# Patient Record
Sex: FEMALE | Race: OTHER | URBAN - METROPOLITAN AREA
[De-identification: names, ages, dates, MRNs, and addresses within clinical notes are randomized per-mention and may not be internally consistent; named-entity substitution may affect disease eponyms.]

---

## 2023-08-07 ENCOUNTER — OFFICE VISIT (OUTPATIENT)
Dept: OBGYN CLINIC | Facility: CLINIC | Age: 18
End: 2023-08-07
Payer: COMMERCIAL

## 2023-08-07 VITALS
DIASTOLIC BLOOD PRESSURE: 72 MMHG | WEIGHT: 187.81 LBS | SYSTOLIC BLOOD PRESSURE: 120 MMHG | HEIGHT: 62 IN | BODY MASS INDEX: 34.56 KG/M2

## 2023-08-07 DIAGNOSIS — Z11.3 SCREEN FOR STD (SEXUALLY TRANSMITTED DISEASE): ICD-10-CM

## 2023-08-07 DIAGNOSIS — Z30.09 FAMILY PLANNING COUNSELING: Primary | ICD-10-CM

## 2023-08-07 PROCEDURE — 3078F DIAST BP <80 MM HG: CPT | Performed by: OBSTETRICS & GYNECOLOGY

## 2023-08-07 PROCEDURE — 87491 CHLMYD TRACH DNA AMP PROBE: CPT | Performed by: OBSTETRICS & GYNECOLOGY

## 2023-08-07 PROCEDURE — 99203 OFFICE O/P NEW LOW 30 MIN: CPT | Performed by: OBSTETRICS & GYNECOLOGY

## 2023-08-07 PROCEDURE — 3074F SYST BP LT 130 MM HG: CPT | Performed by: OBSTETRICS & GYNECOLOGY

## 2023-08-07 PROCEDURE — 87591 N.GONORRHOEAE DNA AMP PROB: CPT | Performed by: OBSTETRICS & GYNECOLOGY

## 2023-08-07 PROCEDURE — 3008F BODY MASS INDEX DOCD: CPT | Performed by: OBSTETRICS & GYNECOLOGY

## 2023-08-07 NOTE — PROGRESS NOTES
New Patient GYN History and Physical  [facility]    CHIEF COMPLAINT:  Patient presents with:  Consult: contraception   HISTORY OF PRESENT ILLNESS:   Benita Le is a 21year old female [facility]  who presents for family planning. Has been sexually active with current partner x 3 months. Using condoms. Feels safe, denies abuse. PAST GYNECOLOGICAL HISTORY & OTHER PREVENTIVE MEDICINE  LMP: Patient's last menstrual period was 2023. Menarche: 11years old (2023 11:26 AM)  Period Cycle (Days): monthly (2023 11:26 AM)  Period Duration (Days): 4-5 days (2023 11:26 AM)  Period Flow: moderate (2023 11:26 AM)  Use of Birth Control (if yes, specify type): None (2023 11:26 AM)  Pap Result Notes: no pap hx under 21 (2023 48:99 AM)    Complications: monthly, no c/o  Gravita/Parity:   Contraception: current -condom; Previous -   Sexually transmitted disease history:None  Number of sexual partners: current sexual partners: 1, 3 months yrs, Lifetime partners: 1  Pap history: NALast pap/result: ; history abnormals:   Abuse history: denies  Vaginal discharge: denies  Bladder symptoms: denies    PAST MEDICAL HISTORY:   Past Medical History:   Diagnosis Date    Anxiety     Depression         PAST SURGICAL HISTORY:   Past Surgical History:   Procedure Laterality Date    Patient denies any surgical history          PAST OB HISTORY:  OB History    Para Term  AB Living   0 0 0 0 0 0   SAB IAB Ectopic Multiple Live Births   0 0 0 0 0       CURRENT MEDICATIONS:    No current outpatient medications on file.     ALLERGIES:  No Known Allergies    SOCIAL HISTORY:  Social History    Socioeconomic History      Marital status: Single    Tobacco Use      Smoking status: Never      Smokeless tobacco: Never    Substance and Sexual Activity      Alcohol use: Never      Drug use: Never      Sexual activity: Yes        Partners: Male    Other Topics      Concerns:        Blood Transfusions: No      FAMILY HISTORY:  Family History   Problem Relation Age of Onset    Brain Cancer Father 79    Breast Cancer Neg     Uterine Cancer Neg     Ovarian Cancer Neg      ASSESSMENTS:  PHYSICAL EXAM:   Patient's last menstrual period was 08/02/2023.    08/07/23  1125   BP: 120/72   Weight: 187 lb 12.8 oz (85.2 kg)   Height: 62\"     CONSTITUTIONAL: Awake, alert, cooperative, no apparent distress, and appears stated age   NECK: Supple, symmetrical, trachea midline, no adenopathy, thyroid symmetric, not enlarged and no tenderness  LUNGS: Clear to auscultation bilaterally, no crackles or wheezing  CARDIOVASCULAR: Regular rate and rhythm, normal S1 and S2, no murmur noted  ABDOMEN: Soft, non-distended, non-tender, no masses palpated      GENITAL/URINARY:  deferred    MUSCULOSKELETAL: There is no redness, warmth, or swelling of the joints. Full range of motion noted. Motor strength is 5 out of 5 all extremities bilaterally. Tone is normal.  NEUROLOGIC: Patient is awake, alert and oriented to name, place and time. Casual gait is normal.  SKIN: no bruising or bleeding and no rashes  PSYCHIATRIC: Behavior:  Appropriate  Mood:  appropriate  ASSESSMENT AND PLAN:  1. Family planning counseling  I reviewed options with patient for contraception including all methods (OCPs, DMPA, patch, ring, condoms, IUDs, Implants, and sterilization). Discussed efficacy of each, benefits and risks of methods, typical side effects, mechanism of action, and how to use methods. I reviewed that no method is 100% effective and that failures do occur. Patient is most interested in [place]. Provided patient with information about this method and patient will return for placement. Advised to premedicate with ibuprofen. 2. Screen for STD (sexually transmitted disease)    - CHLAMYDIA/GONOCOCCUS, SUHA;  Future       follow up 1 yr or as needed  Alcon Hernandez MD

## 2023-08-08 LAB
C TRACH DNA SPEC QL NAA+PROBE: NEGATIVE
N GONORRHOEA DNA SPEC QL NAA+PROBE: NEGATIVE

## 2023-08-19 ENCOUNTER — APPOINTMENT (OUTPATIENT)
Dept: ULTRASOUND IMAGING | Facility: HOSPITAL | Age: 18
End: 2023-08-19
Attending: INTERNAL MEDICINE
Payer: COMMERCIAL

## 2023-08-19 ENCOUNTER — HOSPITAL ENCOUNTER (INPATIENT)
Facility: HOSPITAL | Age: 18
LOS: 1 days | Discharge: HOME | End: 2023-08-20
Attending: HOSPITALIST | Admitting: INTERNAL MEDICINE
Payer: COMMERCIAL

## 2023-08-19 ENCOUNTER — TELEPHONE (OUTPATIENT)
Dept: CASE MANAGEMENT | Facility: HOSPITAL | Age: 18
End: 2023-08-19

## 2023-08-19 PROBLEM — N70.92 OVARIAN ABSCESS: Status: ACTIVE | Noted: 2023-08-19

## 2023-08-19 LAB — B-HCG UR QL: NEGATIVE

## 2023-08-19 PROCEDURE — 76856 US EXAM PELVIC COMPLETE: CPT | Performed by: INTERNAL MEDICINE

## 2023-08-19 PROCEDURE — 99222 1ST HOSP IP/OBS MODERATE 55: CPT | Performed by: INTERNAL MEDICINE

## 2023-08-19 PROCEDURE — 76830 TRANSVAGINAL US NON-OB: CPT | Performed by: INTERNAL MEDICINE

## 2023-08-19 RX ORDER — IBUPROFEN 200 MG
200 TABLET ORAL EVERY 4 HOURS PRN
Status: DISCONTINUED | OUTPATIENT
Start: 2023-08-19 | End: 2023-08-20

## 2023-08-19 RX ORDER — METRONIDAZOLE 500 MG/100ML
500 INJECTION, SOLUTION INTRAVENOUS 2 TIMES DAILY
Status: DISCONTINUED | OUTPATIENT
Start: 2023-08-19 | End: 2023-08-20

## 2023-08-19 NOTE — PLAN OF CARE
From home to [facility] Direct admit this morning with abdominal pain. Morphine was given at [facility]. Message sent perfect serve to Dr Amena Doran at 10:10 AM and to Dr Lakesha Elena at 11:24 and called at 12:50 perfect serve page, and again perfect serve 12:56 text since patient now requesting to see JESSICA CLARKE informed. Awaiting response. 1400- orders received. Went down for [place]. ABX ordered. IV site beeping, attempting restart for ABXendorsed to oncoming RN. Problem: Patient Centered Care  Goal: Patient preferences are identified and integrated in the patient's plan of care  Description: Interventions:  - What would you like us to know as we care for you?  My OB GYNE doctor is here  - Provide timely, complete, and accurate information to patient/family  - Incorporate patient and family knowledge, values, beliefs, and cultural backgrounds into the planning and delivery of care  - Encourage patient/family to participate in care and decision-making at the level they choose  - Honor patient and family perspectives and choices  Outcome: Progressing     Problem: Patient/Family Goals  Goal: Patient/Family Long Term Goal  Description: Patient's Long Term Goal: return home    Interventions:  - MD to see and eval  - See additional Care Plan goals for specific interventions  Outcome: Progressing  Goal: Patient/Family Short Term Goal  Description: Patient's Short Term Goal: Pain <5/10    Interventions:   - Awaiting orders for pain management  - See additional Care Plan goals for specific interventions  Outcome: Progressing     Problem: PAIN - ADULT  Goal: Verbalizes/displays adequate comfort level or patient's stated pain goal  Description: INTERVENTIONS:  - Encourage pt to monitor pain and request assistance  - Assess pain using appropriate pain scale  - Administer analgesics based on type and severity of pain and evaluate response  - Implement non-pharmacological measures as appropriate and evaluate response  - Consider cultural and social influences on pain and pain management  - Manage/alleviate anxiety  - Utilize distraction and/or relaxation techniques  - Monitor for opioid side effects  - Notify MD/LIP if interventions unsuccessful or patient reports new pain  - Anticipate increased pain with activity and pre-medicate as appropriate  Outcome: Progressing     Problem: RISK FOR INFECTION - ADULT  Goal: Absence of fever/infection during anticipated neutropenic period  Description: INTERVENTIONS  - Monitor WBC  - Administer growth factors as ordered  - Implement neutropenic guidelines  Outcome: Progressing     Problem: SAFETY ADULT - FALL  Goal: Free from fall injury  Description: INTERVENTIONS:  - Assess pt frequently for physical needs  - Identify cognitive and physical deficits and behaviors that affect risk of falls.   - [place] fall precautions as indicated by assessment.  - Educate pt/family on patient safety including physical limitations  - Instruct pt to call for assistance with activity based on assessment  - Modify environment to reduce risk of injury  - Provide assistive devices as appropriate  - Consider OT/PT consult to assist with strengthening/mobility  - Encourage toileting schedule  Outcome: Progressing     Problem: DISCHARGE PLANNING  Goal: Discharge to home or other facility with appropriate resources  Description: INTERVENTIONS:  - Identify barriers to discharge w/pt and caregiver  - Include patient/family/discharge partner in discharge planning  - Arrange for needed discharge resources and transportation as appropriate  - Identify discharge learning needs (meds, wound care, etc)  - Arrange for interpreters to assist at discharge as needed  - Consider post-discharge preferences of patient/family/discharge partner  - Complete POLST form as appropriate  - Assess patient's ability to be responsible for managing their own health  - Refer to Case Management Department for coordinating discharge planning if the patient needs post-hospital services based on physician/LIP order or complex needs related to functional status, cognitive ability or social support system  Outcome: Progressing

## 2023-08-20 VITALS
DIASTOLIC BLOOD PRESSURE: 57 MMHG | TEMPERATURE: 98 F | RESPIRATION RATE: 18 BRPM | OXYGEN SATURATION: 100 % | BODY MASS INDEX: 33 KG/M2 | WEIGHT: 179 LBS | SYSTOLIC BLOOD PRESSURE: 115 MMHG | HEART RATE: 69 BPM

## 2023-08-20 LAB
ALBUMIN SERPL-MCNC: 3.3 G/DL (ref 3.4–5)
ALBUMIN/GLOB SERPL: 1 {RATIO} (ref 1–2)
ALP LIVER SERPL-CCNC: 59 U/L
ALT SERPL-CCNC: 11 U/L
ANION GAP SERPL CALC-SCNC: 5 MMOL/L (ref 0–18)
AST SERPL-CCNC: 11 U/L (ref 15–37)
BASOPHILS # BLD AUTO: 0.01 X10(3) UL (ref 0–0.2)
BASOPHILS NFR BLD AUTO: 0.1 %
BILIRUB SERPL-MCNC: 0.3 MG/DL (ref 0.1–2)
BUN BLD-MCNC: 17 MG/DL (ref 7–18)
BUN/CREAT SERPL: 24.3 (ref 10–20)
CALCIUM BLD-MCNC: 9 MG/DL (ref 8.5–10.1)
CHLORIDE SERPL-SCNC: 109 MMOL/L (ref 98–112)
CO2 SERPL-SCNC: 28 MMOL/L (ref 21–32)
CREAT BLD-MCNC: 0.7 MG/DL
DEPRECATED RDW RBC AUTO: 42.1 FL (ref 35.1–46.3)
EGFRCR SERPLBLD CKD-EPI 2021: 128 ML/MIN/1.73M2 (ref 60–?)
EOSINOPHIL # BLD AUTO: 0.06 X10(3) UL (ref 0–0.7)
EOSINOPHIL NFR BLD AUTO: 0.9 %
ERYTHROCYTE [DISTWIDTH] IN BLOOD BY AUTOMATED COUNT: 12.8 % (ref 11–15)
GLOBULIN PLAS-MCNC: 3.2 G/DL (ref 2.8–4.4)
GLUCOSE BLD-MCNC: 90 MG/DL (ref 70–99)
HCT VFR BLD AUTO: 34.9 %
HGB BLD-MCNC: 11.6 G/DL
IMM GRANULOCYTES # BLD AUTO: 0.03 X10(3) UL (ref 0–1)
IMM GRANULOCYTES NFR BLD: 0.4 %
LYMPHOCYTES # BLD AUTO: 1.96 X10(3) UL (ref 1.5–5)
LYMPHOCYTES NFR BLD AUTO: 28.7 %
MCH RBC QN AUTO: 29.5 PG (ref 26–34)
MCHC RBC AUTO-ENTMCNC: 33.2 G/DL (ref 31–37)
MCV RBC AUTO: 88.8 FL
MONOCYTES # BLD AUTO: 0.68 X10(3) UL (ref 0.1–1)
MONOCYTES NFR BLD AUTO: 10 %
NEUTROPHILS # BLD AUTO: 4.09 X10 (3) UL (ref 1.5–7.7)
NEUTROPHILS # BLD AUTO: 4.09 X10(3) UL (ref 1.5–7.7)
NEUTROPHILS NFR BLD AUTO: 59.9 %
OSMOLALITY SERPL CALC.SUM OF ELEC: 295 MOSM/KG (ref 275–295)
PLATELET # BLD AUTO: 308 10(3)UL (ref 150–450)
POTASSIUM SERPL-SCNC: 4.5 MMOL/L (ref 3.5–5.1)
PROT SERPL-MCNC: 6.5 G/DL (ref 6.4–8.2)
RBC # BLD AUTO: 3.93 X10(6)UL
SODIUM SERPL-SCNC: 142 MMOL/L (ref 136–145)
WBC # BLD AUTO: 6.8 X10(3) UL (ref 4–11)

## 2023-08-20 PROCEDURE — 99239 HOSP IP/OBS DSCHRG MGMT >30: CPT | Performed by: HOSPITALIST

## 2023-08-20 RX ORDER — DOXYCYCLINE HYCLATE 100 MG/1
100 CAPSULE ORAL 2 TIMES DAILY
Qty: 28 CAPSULE | Refills: 0 | Status: SHIPPED | OUTPATIENT
Start: 2023-08-20 | End: 2023-09-03

## 2023-08-20 RX ORDER — IBUPROFEN 200 MG
200 TABLET ORAL EVERY 4 HOURS PRN
Qty: 20 TABLET | Refills: 0 | Status: SHIPPED | OUTPATIENT
Start: 2023-08-20 | End: 2023-08-25

## 2023-08-20 RX ORDER — METRONIDAZOLE 500 MG/1
500 TABLET ORAL 3 TIMES DAILY
Qty: 42 TABLET | Refills: 0 | Status: SHIPPED | OUTPATIENT
Start: 2023-08-20 | End: 2023-09-03

## 2023-08-20 RX ORDER — ONDANSETRON 4 MG/1
4 TABLET, FILM COATED ORAL EVERY 8 HOURS PRN
Qty: 20 TABLET | Refills: 0 | Status: SHIPPED | OUTPATIENT
Start: 2023-08-20

## 2023-08-20 NOTE — DISCHARGE SUMMARY
[facility]     Discharge Summary    Jc Nick Patient Status:  Inpatient    12/15/2005 MRN [id number]   Location [facility] 4W/SW/SE Attending Kanika Brito MD   Hosp Day # 1 PCP No primary care provider on file. Date of Admission: 2023    Date of Discharge: 2023    Admitting Diagnosis: Ovarian abscess [N70.92]    Discharge Diagnosis: Patient Active Problem List:     Ovarian abscess      Reason for Admission:     Abdominal Pain    Physical Exam:     HEENT: Normocephalic atraumatic. Moist mucous membranes. EOM-I. PERRLA. Anicteric. Neck: No lymphadenopathy. No JVD. No carotid bruits. Respiratory: Clear to auscultation bilaterally. No wheezes. No rhonchi. Cardiovascular: S1, S2. Regular rate and rhythm. No murmurs, rubs or gallops. Equal pulses. Chest and Back: No tenderness or deformity. Abdomen: Soft, nontender, nondistended. Positive bowel sounds. No rebound, guarding or organomegaly. Neurologic: No focal neurological deficits. CNII-XII grossly intact. Musculoskeletal: Moves all extremities. Extremities: No edema or cyanosis. Psychiatric: Appropriate mood and affect. Integument: No rashes or lesions. Hospital Course:     #Concern for possible tubo-ovarian abscess  -1 more dose of Rocephin 1 g today  -Ultrasound  -Ibuprofen as needed  -Consult OB/GYN    PLAN TO DC WITH 14 DAYS OF PO DOXY AND FLAGYL  OUTPATIENT F/U WITH OB/GYNE      History of Present Illness:     PER ADMITTING ATTENDING:   Jc Nick is a 21year old female with no significant past medical history who reports acute onset of periumbilical sharp stabbing pain that started around 9 PM last night. She tried to have a bowel movement thinking this would make it better but was unable to. Went to the emergency room for evaluation where CAT scan was performed and was suspicious for tubo-ovarian abscess. Last menstrual period was approximately 2 weeks ago.   This type of event has never happened to her before. He was given Rocephin and IV morphine as well as had a pelvic exam.  Her white count was mildly elevated at 12,000. GC chlamydia and wet mount are pending. She requested transfer to [place] where her obstetrician resides. She is sitting up in bed without obvious distress. She has had no pain throughout today. We are now at 18 hours past the onset of this episode last night at 9 PM.    Disposition: Final discharge disposition not confirmed    Discharge Condition: Stable    Discharge Medications: Current Discharge Medication List    START taking these medications    ibuprofen 200 MG Oral Tab  Take 1 tablet (200 mg total) by mouth every 4 (four) hours as needed for Fever (equal to or greater than 100.4). Qty: 20 tablet Refills: 0    doxycycline 100 MG Oral Cap  Take 1 capsule (100 mg total) by mouth 2 (two) times daily for 14 days. Qty: 28 capsule Refills: 0    metRONIDAZOLE 500 MG Oral Tab  Take 1 tablet (500 mg total) by mouth 3 (three) times daily for 14 days. Qty: 42 tablet Refills: 0          TOTAL DC TIME > 30 MIN    Rasheeda Meadows MD  8/20/2023  12:15 PM     Hospital Discharge Diagnoses:  toa    Lace+ Score: 16  59-90 High Risk  29-58 Medium Risk  0-28   Low Risk. TCM Follow-Up Recommendation:  LACE 29-58:  Moderate Risk of readmission after discharge from the hospital.

## 2023-08-20 NOTE — PLAN OF CARE
Patient is alert and oriented x4. Up ad christine. Patient voiding and passing flatus. Patient reports bowel movement today. Patient on regular diet, tolerating. Patient denies any complaints at this time. Per Dr. Mel Dewitt patient to receive today's dose of ceftriaxone and then ok to discharge. Discharge home today with family. Reviewed discharge instructions with patient. Patient verbalized understanding. RX sent to pharmacy for doxycycline, zofran, ibuprofen, and metronidazole. Home with family.        Problem: Patient Centered Care  Goal: Patient preferences are identified and integrated in the patient's plan of care  Description: Interventions:  - What would you like us to know as we care for you?   - Provide timely, complete, and accurate information to patient/family  - Incorporate patient and family knowledge, values, beliefs, and cultural backgrounds into the planning and delivery of care  - Encourage patient/family to participate in care and decision-making at the level they choose  - Honor patient and family perspectives and choices  8/20/2023 1217 by Juan F Bucio RN  Outcome: Adequate for Discharge  8/20/2023 1216 by Juan F Bucio RN  Outcome: Adequate for Discharge     Problem: Patient/Family Goals  Goal: Patient/Family Long Term Goal  Description: Patient's Long Term Goal:discharge     Interventions:  - abx   -pain management   -ambulate   - See additional Care Plan goals for specific interventions  8/20/2023 1217 by Juan F Bucio RN  Outcome: Adequate for Discharge  8/20/2023 1216 by Juan F Bucio RN  Outcome: Adequate for Discharge  Goal: Patient/Family Short Term Goal  Description: Patient's Short Term Goal: pain management     Interventions:   - IV abx   -pain meds PRN   -ambulate   - See additional Care Plan goals for specific interventions  8/20/2023 1217 by Juan F Bucio RN  Outcome: Adequate for Discharge  8/20/2023 1216 by Juan F Bucio RN  Outcome: Adequate for Discharge Problem: PAIN - ADULT  Goal: Verbalizes/displays adequate comfort level or patient's stated pain goal  Description: INTERVENTIONS:  - Encourage pt to monitor pain and request assistance  - Assess pain using appropriate pain scale  - Administer analgesics based on type and severity of pain and evaluate response  - Implement non-pharmacological measures as appropriate and evaluate response  - Consider cultural and social influences on pain and pain management  - Manage/alleviate anxiety  - Utilize distraction and/or relaxation techniques  - Monitor for opioid side effects  - Notify MD/LIP if interventions unsuccessful or patient reports new pain  - Anticipate increased pain with activity and pre-medicate as appropriate  8/20/2023 1217 by Ibrahima Bolton RN  Outcome: Adequate for Discharge  8/20/2023 1216 by Ibrahima Bolton RN  Outcome: Adequate for Discharge     Problem: RISK FOR INFECTION - ADULT  Goal: Absence of fever/infection during anticipated neutropenic period  Description: INTERVENTIONS  - Monitor WBC  - Administer growth factors as ordered  - Implement neutropenic guidelines  8/20/2023 1217 by Ibrahima Bolton RN  Outcome: Adequate for Discharge  8/20/2023 1216 by Ibrahima Bolton RN  Outcome: Adequate for Discharge     Problem: SAFETY ADULT - FALL  Goal: Free from fall injury  Description: INTERVENTIONS:  - Assess pt frequently for physical needs  - Identify cognitive and physical deficits and behaviors that affect risk of falls.   - [place] fall precautions as indicated by assessment.  - Educate pt/family on patient safety including physical limitations  - Instruct pt to call for assistance with activity based on assessment  - Modify environment to reduce risk of injury  - Provide assistive devices as appropriate  - Consider OT/PT consult to assist with strengthening/mobility  - Encourage toileting schedule  8/20/2023 1217 by Ibrahima Bolotn RN  Outcome: Adequate for Discharge  8/20/2023 1216 by Robin Gallagher RN  Outcome: Adequate for Discharge     Problem: DISCHARGE PLANNING  Goal: Discharge to home or other facility with appropriate resources  Description: INTERVENTIONS:  - Identify barriers to discharge w/pt and caregiver  - Include patient/family/discharge partner in discharge planning  - Arrange for needed discharge resources and transportation as appropriate  - Identify discharge learning needs (meds, wound care, etc)  - Arrange for interpreters to assist at discharge as needed  - Consider post-discharge preferences of patient/family/discharge partner  - Complete POLST form as appropriate  - Assess patient's ability to be responsible for managing their own health  - Refer to Case Management Department for coordinating discharge planning if the patient needs post-hospital services based on physician/LIP order or complex needs related to functional status, cognitive ability or social support system  8/20/2023 1217 by Robin Gallagher, RN  Outcome: Adequate for Discharge  8/20/2023 1216 by Robin Gallagher, RN  Outcome: Adequate for Discharge

## 2023-08-21 ENCOUNTER — PATIENT MESSAGE (OUTPATIENT)
Dept: OBGYN CLINIC | Facility: CLINIC | Age: 18
End: 2023-08-21

## 2023-09-05 ENCOUNTER — OFFICE VISIT (OUTPATIENT)
Dept: OBGYN CLINIC | Facility: CLINIC | Age: 18
End: 2023-09-05
Payer: COMMERCIAL

## 2023-09-05 VITALS
BODY MASS INDEX: 34.17 KG/M2 | DIASTOLIC BLOOD PRESSURE: 66 MMHG | HEIGHT: 62 IN | SYSTOLIC BLOOD PRESSURE: 108 MMHG | TEMPERATURE: 98 F | WEIGHT: 185.69 LBS

## 2023-09-05 DIAGNOSIS — N70.92 OVARIAN ABSCESS: Primary | ICD-10-CM

## 2023-09-05 PROCEDURE — 3078F DIAST BP <80 MM HG: CPT | Performed by: OBSTETRICS & GYNECOLOGY

## 2023-09-05 PROCEDURE — 99214 OFFICE O/P EST MOD 30 MIN: CPT | Performed by: OBSTETRICS & GYNECOLOGY

## 2023-09-05 PROCEDURE — 3008F BODY MASS INDEX DOCD: CPT | Performed by: OBSTETRICS & GYNECOLOGY

## 2023-09-05 PROCEDURE — 3074F SYST BP LT 130 MM HG: CPT | Performed by: OBSTETRICS & GYNECOLOGY

## 2023-11-07 ENCOUNTER — TELEPHONE (OUTPATIENT)
Dept: OBGYN CLINIC | Facility: CLINIC | Age: 18
End: 2023-11-07

## 2024-01-17 ENCOUNTER — PATIENT MESSAGE (OUTPATIENT)
Dept: OBGYN CLINIC | Facility: CLINIC | Age: 19
End: 2024-01-17

## 2024-01-18 ENCOUNTER — HOSPITAL ENCOUNTER (EMERGENCY)
Facility: HOSPITAL | Age: 19
Discharge: HOME | End: 2024-01-18
Payer: COMMERCIAL

## 2024-01-18 VITALS
TEMPERATURE: 98 F | SYSTOLIC BLOOD PRESSURE: 121 MMHG | HEART RATE: 93 BPM | RESPIRATION RATE: 18 BRPM | DIASTOLIC BLOOD PRESSURE: 71 MMHG | WEIGHT: 187 LBS | BODY MASS INDEX: 34.41 KG/M2 | OXYGEN SATURATION: 100 % | HEIGHT: 62 IN

## 2024-01-18 DIAGNOSIS — R11.0 NAUSEA: ICD-10-CM

## 2024-01-18 DIAGNOSIS — R10.84 ABDOMINAL PAIN, GENERALIZED: Primary | ICD-10-CM

## 2024-01-18 LAB
ALBUMIN SERPL-MCNC: 4.1 G/DL (ref 3.2–4.8)
ALP LIVER SERPL-CCNC: 63 U/L
ALT SERPL-CCNC: 10 U/L
ANION GAP SERPL CALC-SCNC: 4 MMOL/L (ref 0–18)
AST SERPL-CCNC: 16 U/L (ref ?–34)
B-HCG UR QL: NEGATIVE
BASOPHILS # BLD AUTO: 0.03 X10(3) UL (ref 0–0.2)
BASOPHILS NFR BLD AUTO: 0.4 %
BILIRUB DIRECT SERPL-MCNC: 0.2 MG/DL (ref ?–0.3)
BILIRUB SERPL-MCNC: 0.6 MG/DL (ref 0.3–1.2)
BILIRUB UR QL: NEGATIVE
BUN BLD-MCNC: 13 MG/DL (ref 9–23)
BUN/CREAT SERPL: 22.8 (ref 10–20)
CALCIUM BLD-MCNC: 9 MG/DL (ref 8.7–10.4)
CHLORIDE SERPL-SCNC: 109 MMOL/L (ref 98–112)
CO2 SERPL-SCNC: 26 MMOL/L (ref 21–32)
COLOR UR: YELLOW
CREAT BLD-MCNC: 0.57 MG/DL
DEPRECATED RDW RBC AUTO: 39.1 FL (ref 35.1–46.3)
EGFRCR SERPLBLD CKD-EPI 2021: 133 ML/MIN/1.73M2 (ref 60–?)
EOSINOPHIL # BLD AUTO: 0.07 X10(3) UL (ref 0–0.7)
EOSINOPHIL NFR BLD AUTO: 0.9 %
ERYTHROCYTE [DISTWIDTH] IN BLOOD BY AUTOMATED COUNT: 12.7 % (ref 11–15)
GLUCOSE BLD-MCNC: 86 MG/DL (ref 70–99)
GLUCOSE UR-MCNC: NORMAL MG/DL
HCT VFR BLD AUTO: 34.7 %
HGB BLD-MCNC: 11.9 G/DL
IMM GRANULOCYTES # BLD AUTO: 0.02 X10(3) UL (ref 0–1)
IMM GRANULOCYTES NFR BLD: 0.2 %
KETONES UR-MCNC: NEGATIVE MG/DL
LEUKOCYTE ESTERASE UR QL STRIP.AUTO: NEGATIVE
LIPASE SERPL-CCNC: 30 U/L (ref 13–75)
LYMPHOCYTES # BLD AUTO: 2.09 X10(3) UL (ref 1–4)
LYMPHOCYTES NFR BLD AUTO: 25.7 %
MCH RBC QN AUTO: 29.2 PG (ref 26–34)
MCHC RBC AUTO-ENTMCNC: 34.3 G/DL (ref 31–37)
MCV RBC AUTO: 85 FL
MONOCYTES # BLD AUTO: 0.76 X10(3) UL (ref 0.1–1)
MONOCYTES NFR BLD AUTO: 9.3 %
NEUTROPHILS # BLD AUTO: 5.16 X10 (3) UL (ref 1.5–7.7)
NEUTROPHILS # BLD AUTO: 5.16 X10(3) UL (ref 1.5–7.7)
NEUTROPHILS NFR BLD AUTO: 63.5 %
NITRITE UR QL STRIP.AUTO: NEGATIVE
OSMOLALITY SERPL CALC.SUM OF ELEC: 287 MOSM/KG (ref 275–295)
PH UR: 7.5 [PH] (ref 5–8)
PLATELET # BLD AUTO: 339 10(3)UL (ref 150–450)
POTASSIUM SERPL-SCNC: 3.4 MMOL/L (ref 3.5–5.1)
PROT SERPL-MCNC: 7 G/DL (ref 5.7–8.2)
PROT UR-MCNC: 20 MG/DL
RBC # BLD AUTO: 4.08 X10(6)UL
RBC #/AREA URNS AUTO: >10 /HPF
SODIUM SERPL-SCNC: 139 MMOL/L (ref 136–145)
SP GR UR STRIP: 1.03 (ref 1–1.03)
UROBILINOGEN UR STRIP-ACNC: NORMAL
WBC # BLD AUTO: 8.1 X10(3) UL (ref 4–11)

## 2024-01-18 PROCEDURE — 96374 THER/PROPH/DIAG INJ IV PUSH: CPT

## 2024-01-18 PROCEDURE — 99284 EMERGENCY DEPT VISIT MOD MDM: CPT

## 2024-01-18 PROCEDURE — 81001 URINALYSIS AUTO W/SCOPE: CPT | Performed by: NURSE PRACTITIONER

## 2024-01-18 PROCEDURE — 96361 HYDRATE IV INFUSION ADD-ON: CPT

## 2024-01-18 PROCEDURE — 85025 COMPLETE CBC W/AUTO DIFF WBC: CPT | Performed by: NURSE PRACTITIONER

## 2024-01-18 PROCEDURE — 83690 ASSAY OF LIPASE: CPT | Performed by: NURSE PRACTITIONER

## 2024-01-18 PROCEDURE — 80048 BASIC METABOLIC PNL TOTAL CA: CPT | Performed by: NURSE PRACTITIONER

## 2024-01-18 PROCEDURE — 80076 HEPATIC FUNCTION PANEL: CPT | Performed by: NURSE PRACTITIONER

## 2024-01-18 PROCEDURE — 81025 URINE PREGNANCY TEST: CPT

## 2024-01-18 RX ORDER — DICYCLOMINE HCL 20 MG
20 TABLET ORAL ONCE
Status: COMPLETED | OUTPATIENT
Start: 2024-01-18 | End: 2024-01-18

## 2024-01-18 RX ORDER — ONDANSETRON 4 MG/1
4 TABLET, ORALLY DISINTEGRATING ORAL EVERY 4 HOURS PRN
Qty: 10 TABLET | Refills: 0 | Status: SHIPPED | OUTPATIENT
Start: 2024-01-18 | End: 2024-01-25

## 2024-01-18 RX ORDER — ONDANSETRON 2 MG/ML
4 INJECTION INTRAMUSCULAR; INTRAVENOUS ONCE
Status: COMPLETED | OUTPATIENT
Start: 2024-01-18 | End: 2024-01-18

## 2024-01-18 RX ORDER — DICYCLOMINE HCL 20 MG
20 TABLET ORAL 4 TIMES DAILY PRN
Qty: 30 TABLET | Refills: 0 | Status: SHIPPED | OUTPATIENT
Start: 2024-01-18 | End: 2024-02-17

## 2024-01-18 NOTE — ED PROVIDER NOTES
Patient Seen in: [facility] Emergency Department      History     Chief Complaint   Patient presents with    Abdomen/Flank Pain     Stated Complaint: abd pain    Subjective:   Patient presents to the emergency room with a complaint of generalized abdominal pain that started yesterday.  Patient states he has slight nausea.  Denies vomiting or diarrhea.  Denies fever or chills.  Denies  symptoms.  Denies chest pain or shortness of breath.  Denies cough or URI symptoms.  Denies headache.  Denies neck pain.  Patient states that she did have abdominal pain several months ago and was diagnosed with a ruptured ovarian cyst.    The history is provided by the patient.           Objective:   Past Medical History:   Diagnosis Date    Anxiety     Depression               Past Surgical History:   Procedure Laterality Date    PATIENT DENIES ANY SURGICAL HISTORY                  Social History     Socioeconomic History    Marital status: Single   Tobacco Use    Smoking status: Never    Smokeless tobacco: Never   Substance and Sexual Activity    Alcohol use: Never    Drug use: Never    Sexual activity: Yes     Partners: Male   Other Topics Concern    Blood Transfusions No              Review of Systems    Positive for stated complaint: abd pain  Other systems are as noted in HPI.  Constitutional and vital signs reviewed.      All other systems reviewed and negative except as noted above.    Physical Exam     ED Triage Vitals [01/18/24 1141]   /71   Pulse 93   Resp 18   Temp 97.9 °F (36.6 °C)   Temp src Oral   SpO2 100 %   O2 Device None (Room air)       Current:/71   Pulse 93   Temp 97.9 °F (36.6 °C) (Oral)   Resp 18   Ht 157.5 cm (5' 2\")   Wt 84.8 kg   LMP 01/15/2024 (Approximate)   SpO2 100%   BMI 34.20 kg/m²         Physical Exam  Vitals and nursing note reviewed.   Constitutional:       Appearance: She is well-developed and overweight.   HENT:      Head: Normocephalic and atraumatic.      Right  Ear: External ear normal.      Left Ear: External ear normal.      Nose: Nose normal.   Eyes:      Conjunctiva/sclera: Conjunctivae normal.      Pupils: Pupils are equal, round, and reactive to light.   Cardiovascular:      Rate and Rhythm: Normal rate and regular rhythm.      Heart sounds: Normal heart sounds.   Pulmonary:      Effort: Pulmonary effort is normal.      Breath sounds: Normal breath sounds.   Abdominal:      General: Bowel sounds are normal.      Palpations: Abdomen is soft.      Tenderness: There is generalized abdominal tenderness. There is no right CVA tenderness, left CVA tenderness, guarding or rebound.      Comments: Patient has generalized abdominal tenderness on exam.  No right lower quadrant tenderness.  No rebound.  No guarding.  No CVA tenderness.   Musculoskeletal:         General: Normal range of motion.      Cervical back: Normal range of motion and neck supple.   Skin:     General: Skin is warm and dry.   Neurological:      Mental Status: She is alert and oriented to person, place, and time.      Deep Tendon Reflexes: Reflexes are normal and symmetric.   Psychiatric:         Behavior: Behavior normal.         Thought Content: Thought content normal.         Judgment: Judgment normal.               ED Course     Labs Reviewed   BASIC METABOLIC PANEL (8) - Abnormal; Notable for the following components:       Result Value    Potassium 3.4 (*)     BUN/CREA Ratio 22.8 (*)     All other components within normal limits   URINALYSIS WITH CULTURE REFLEX - Abnormal; Notable for the following components:    Clarity Urine Turbid (*)     Blood Urine 3+ (*)     Protein Urine 20 (*)     RBC Urine >10 (*)     Squamous Epi. Cells Few (*)     All other components within normal limits   CBC W/ DIFFERENTIAL - Abnormal; Notable for the following components:    HGB 11.9 (*)     HCT 34.7 (*)     All other components within normal limits   HEPATIC FUNCTION PANEL (7) - Normal   LIPASE - Normal   POCT PREGNANCY  URINE - Normal   CBC WITH DIFFERENTIAL WITH PLATELET    Narrative:     The following orders were created for panel order CBC With Differential With Platelet.  Procedure                               Abnormality         Status                     ---------                               -----------         ------                     CBC W/ DIFFERENTIAL[[id number]]          Abnormal            Final result                 Please view results for these tests on the individual orders.   RAINBOW DRAW BLUE   RAINBOW DRAW GOLD                      [facility]                                         Medical Decision Making  Multiple medical diagnoses were considered. Patient has generalized abdominal tenderness on exam.  No right lower quadrant tenderness.  No rebound.  No guarding.  No CVA tenderness. Patient is afebrile, nontoxic appearing, and in no acute distress.  Vital signs are stable.  Patient was given IV fluids, Zofran, and Bentyl. Patient's labs are unremarkable.  Urine is unremarkable.  UCG is negative.  Patient was given the option of a CT scan of her abdomen and pelvis or to monitor her symptoms.  Patient declined CT scan at this time.  Prescription for Bentyl and Zofran were sent to the pharmacy.  [place] diet advised.  BRAT diet advised.  Advised to return immediately if she develops fevers, worsening abdominal pain, vomiting, or she has any new or worsening symptoms.  Strict return precautions were given.  She was given a copy of her lab results.  Advised on the importance of returning if she is not better since we did not perform a CT scan today.  Patient advised to follow-up with his primary care doctor in 2-3 days.  Patient verbalizes understanding of discharge instructions and plan of care.  Agrees with plan of care at this time.  Advised to return for any new or worsening symptoms.  Follow-up instructions given.        Amount and/or Complexity of Data Reviewed  External Data Reviewed: labs, radiology and  notes.  Labs: ordered. Decision-making details documented in ED Course.        Disposition and Plan     Clinical Impression:  1. Abdominal pain, generalized    2. Nausea         Disposition:  Discharge  1/18/2024  1:47 pm    Follow-up:  Ching Matson MD  [address] [place] [address]  [id number]  [facility] [address]  [phone]    Follow up in 2 day(s)            Medications Prescribed:  Discharge Medication List as of 1/18/2024  1:51 PM        START taking these medications    Details   ondansetron 4 MG Oral Tablet Dispersible Take 1 tablet (4 mg total) by mouth every 4 (four) hours as needed for Nausea., Normal, Disp-10 tablet, R-0      dicyclomine 20 MG Oral Tab Take 1 tablet (20 mg total) by mouth 4 (four) times daily as needed., Normal, Disp-30 tablet, R-0                    This note was made using voice dictation and may include inadvertent errors due to the dictation software.  Please contact for clarification regarding any no discrepancies.    Employed shared decision making with the patient all questions answered.  The patient and/or family was counseled on the preliminary diagnosis, treatment, prescription medications especially for any sedating medications if applicable and instructed on concerning signs and symptoms and when to return to the ED for further evaluation.  Involved parties verbalized her understanding of the discharge instructions given.

## 2024-02-24 ENCOUNTER — HOSPITAL ENCOUNTER (OUTPATIENT)
Dept: CT IMAGING | Age: 19
Discharge: HOME | End: 2024-02-24
Attending: INTERNAL MEDICINE
Payer: COMMERCIAL

## 2024-02-24 DIAGNOSIS — R10.31 RIGHT LOWER QUADRANT PAIN: ICD-10-CM

## 2024-02-24 LAB
CREAT BLD-MCNC: 0.6 MG/DL
EGFRCR SERPLBLD CKD-EPI 2021: 132 ML/MIN/1.73M2 (ref 60–?)

## 2024-02-24 PROCEDURE — 82565 ASSAY OF CREATININE: CPT

## 2024-02-24 PROCEDURE — 74177 CT ABD & PELVIS W/CONTRAST: CPT | Performed by: INTERNAL MEDICINE

## 2024-02-29 ENCOUNTER — TELEPHONE (OUTPATIENT)
Dept: OBGYN CLINIC | Facility: CLINIC | Age: 19
End: 2024-02-29

## 2024-02-29 NOTE — TELEPHONE ENCOUNTER
Received a call from patient requesting a sooner appointment with  .  Per patient she completed a CT scan and confirm she has a cyst .  Please assist .      NOTE : Appointment has been schedule for next available 03/23/2024 and added to the wait list .

## 2024-03-06 ENCOUNTER — OFFICE VISIT (OUTPATIENT)
Dept: OBGYN CLINIC | Facility: CLINIC | Age: 19
End: 2024-03-06
Payer: COMMERCIAL

## 2024-03-06 VITALS
BODY MASS INDEX: 33.19 KG/M2 | DIASTOLIC BLOOD PRESSURE: 64 MMHG | SYSTOLIC BLOOD PRESSURE: 116 MMHG | WEIGHT: 180.38 LBS | HEIGHT: 62 IN

## 2024-03-06 DIAGNOSIS — R10.2 PELVIC PAIN: ICD-10-CM

## 2024-03-06 DIAGNOSIS — N83.201 RIGHT OVARIAN CYST: Primary | ICD-10-CM

## 2024-03-06 PROCEDURE — 87491 CHLMYD TRACH DNA AMP PROBE: CPT | Performed by: OBSTETRICS & GYNECOLOGY

## 2024-03-06 PROCEDURE — 3008F BODY MASS INDEX DOCD: CPT | Performed by: OBSTETRICS & GYNECOLOGY

## 2024-03-06 PROCEDURE — 99213 OFFICE O/P EST LOW 20 MIN: CPT | Performed by: OBSTETRICS & GYNECOLOGY

## 2024-03-06 PROCEDURE — 87086 URINE CULTURE/COLONY COUNT: CPT | Performed by: OBSTETRICS & GYNECOLOGY

## 2024-03-06 PROCEDURE — 3074F SYST BP LT 130 MM HG: CPT | Performed by: OBSTETRICS & GYNECOLOGY

## 2024-03-06 PROCEDURE — 87591 N.GONORRHOEAE DNA AMP PROB: CPT | Performed by: OBSTETRICS & GYNECOLOGY

## 2024-03-06 PROCEDURE — 3078F DIAST BP <80 MM HG: CPT | Performed by: OBSTETRICS & GYNECOLOGY

## 2024-03-06 RX ORDER — LEVONORGESTREL 13.5 MG/1
1 INTRAUTERINE DEVICE INTRAUTERINE ONCE
COMMUNITY

## 2024-03-06 NOTE — PROGRESS NOTES
RETURN GYN OFFICE VISIT  [facility]    CHIEF COMPLAINT:    Chief Complaint   Patient presents with    Follow - Up     Cyst found on right ovary. Usn done 24       HISTORY OF PRESENT ILLNESS:    MARGE is a 18 year old female  here for follow up after CT ordered by PCP for pain.      has had intermittent pain when she spots.  had pain 2024 and was seen in ED - was given pain and antinausea meds. Then had follow up with PCP who ordered CT.  Most recently has pain around time of menses which are very light due to Mirena. Pain lasts about 10-15 min. Gets chills, gets nauseated. Happens once monthly around the time of her pains.     Denies fever, chills, nausea or vomiting. Denies current pain.     Had Mirena IUD placed end of October.  Previously had admission for TOA 2023.    PROCEDURE: CT ABDOMEN + PELVIS (CONTRAST ONLY) (CPT=74177)     COMPARISON: [facility],  PELVIS W EV (CPT=76856/55943), 2023, 3:55 PM.     INDICATIONS: Right lower quadrant pain.     TECHNIQUE: CT images of the abdomen and pelvis were obtained with non-ionic intravenous contrast material.  Automated exposure control for dose reduction was used. Adjustment of the mA and/or kV was done based on the patient's size. Use of iterative  reconstruction technique for dose reduction was used.  Dose information is transmitted to the ACR (American College of Radiology) NRDR (National Radiology Data Registry) which includes the Dose Index Registry.     FINDINGS:  LIVER: No enlargement, atrophy, abnormal density, or significant focal lesion.    BILIARY: The gallbladder is present.  No intra- or extrahepatic biliary ductal dilatation.  SPLEEN: No enlargement or focal lesion.    STOMACH: No gastric obstruction.  Duodenum is unremarkable.  PANCREAS: No lesion, fluid collection, ductal dilatation, or atrophy.    ADRENALS: No nodule or enlargement.  KIDNEYS: No enhancing renal lesion or  hydroureteronephrosis.  AORTA/VASCULAR:   No aortic aneurysm or dissection.  Retroaortic left renal vein.  RETROPERITONEUM: No mass or enlarged adenopathy.    BOWEL/MESENTERY: Moderate volume of fecal material throughout the large bowel from the cecum to the rectum.  There is no small or large bowel obstruction. The terminal ileum and appendix (series 2, image 94) are within normal limits. There is no  significant colonic diverticulosis. There is no free air, free fluid, or lymphadenopathy in the abdomen or pelvis.  ABDOMINAL WALL: No suspicious mass lesion or significant hernia.  URINARY BLADDER: No visible focal wall thickening, lesion or calculus.    PELVIC NODES: No enlarged mass or adenopathy.    PELVIC ORGANS: Anteverted uterus with IUD.  Gross satisfactory position of the IUD.  Right ovary appears enlarged and replaced by a cystic structure measuring 45 x 55 mm.  Contra left ovary is normal in size at 32 x 21 mm and is otherwise unremarkable by   CT imaging technique.  BONES:   No significant bony lesion or fracture.  LUNG BASES: No visible pulmonary or pleural disease.  OTHER: Negative.                 Impression   CONCLUSION:  1. Right ovary is enlarged which is secondary to an enlarged 5 x 6 cm cyst.  This can be better characterized with pelvic ultrasound. IUD is present.  Left ovary has normal CT appearance.  2. There is a moderate quantity of fecal material throughout the large bowel without small bowel dilatation. This can be seen as a normal variant or with constipation.     Dictated by (CST): Job Contreras MD on 2/24/2024 at 4:14 PM      Finalized by (CST): Job Contreras MD on 2/24/2024 at 4:19 PM             REVIEW OF SYSTEMS:   CONSTITUTIONAL:  negative for fevers, chills, sweats and fatigue  GASTROINTESTINAL:  negative for nausea, vomiting, blood in stool, constipation, diarrhea and abdominal pain  GENITOURINARY: negative for UTI sx  Negative for vaginal discharge   SKIN:  negative for  rash, skin  lesion and pruritus  ENDOCRINE:  negative for acne, fatigue, weight gain and weight loss  BEHAVIOR/PSYCH:  negative for depressed mood, anhedonia and anxiety    CURRENT MEDICATIONS:      Current Outpatient Medications:     levonorgestrel (SAVITA) 13.5 MG Intrauterine IUD, 1 each by Intrauterine route one time., Disp: , Rfl:     ondansetron (ZOFRAN) 4 mg tablet, Take 1 tablet (4 mg total) by mouth every 8 (eight) hours as needed for Nausea. (Patient not taking: Reported on 3/6/2024), Disp: 20 tablet, Rfl: 0    PAST MEDICAL, SOCIAL AND FAMILY HISTORY:    Past Medical History:   Diagnosis Date    Anxiety     Depression      Past Surgical History:   Procedure Laterality Date    PATIENT DENIES ANY SURGICAL HISTORY       Family History   Problem Relation Age of Onset    Brain Cancer Father 67    Breast Cancer Neg     Uterine Cancer Neg     Ovarian Cancer Neg      Social History     Socioeconomic History    Marital status: Single   Tobacco Use    Smoking status: Never    Smokeless tobacco: Never   Substance and Sexual Activity    Alcohol use: Never    Drug use: Never    Sexual activity: Yes     Partners: Male     Birth control/protection: I.U.D.   Other Topics Concern    Blood Transfusions No           PHYSICAL EXAM:   Patient's last menstrual period was 02/28/2024 (approximate).; Body mass index is 33 kg/m².      CONSTITUTIONAL:  Awake, alert, cooperative, no apparent distress  EYES: sclera clear and conjunctiva normal  GASTROINTESTINAL:  normal bowel sounds, soft, non-distended, non-tender, no masses palpated  GENITAL/URINARY:  deferred  SKIN:  No rashes  PSYCH:  Affect Normal      ASSESSMENT AND PLAN:    ICD-10-CM    1. Right ovarian cyst  N83.201 US PELVIS W EV (CPT=76856/03261)      2. Pelvic pain  R10.2 Chlamydia/Gc Amplification     Urine Culture, Routine     Chlamydia/Gc Amplification     Urine Culture, Routine        Pelvic ultrasound ordered to better assess the ovarian cyst found on  CT. Patient without current  pain.  Given that pain is appears with menses, discussed with patient that use of a hormonal contraception method that suppresses ovulation may improve her symptoms.  Also, given history of TOA, may have a component of chronic pelvic pain.  Follow-up pending results of ultrasound.      Karely Bocanegra MD

## 2024-03-07 ENCOUNTER — HOSPITAL ENCOUNTER (OUTPATIENT)
Dept: ULTRASOUND IMAGING | Age: 19
Discharge: HOME | End: 2024-03-07
Attending: OBSTETRICS & GYNECOLOGY
Payer: COMMERCIAL

## 2024-03-07 ENCOUNTER — PATIENT MESSAGE (OUTPATIENT)
Dept: OBGYN CLINIC | Facility: CLINIC | Age: 19
End: 2024-03-07

## 2024-03-07 DIAGNOSIS — N83.201 RIGHT OVARIAN CYST: ICD-10-CM

## 2024-03-07 LAB
C TRACH DNA SPEC QL NAA+PROBE: NEGATIVE
N GONORRHOEA DNA SPEC QL NAA+PROBE: NEGATIVE

## 2024-03-07 PROCEDURE — 76830 TRANSVAGINAL US NON-OB: CPT | Performed by: OBSTETRICS & GYNECOLOGY

## 2024-03-07 PROCEDURE — 76856 US EXAM PELVIC COMPLETE: CPT | Performed by: OBSTETRICS & GYNECOLOGY

## 2024-03-07 NOTE — TELEPHONE ENCOUNTER
From: Rebekah Hood  To: Karely Bocanegra  Sent: 3/7/2024 3:47 PM CST  Subject: Question     Hi ! i just got back the results i did have a question not about the urine sample but the ultrasounds themselves they mentioned something about it possibly being endometriosis would i need a follow up for that or everything looks fine ??

## 2024-03-14 ENCOUNTER — TELEMEDICINE (OUTPATIENT)
Dept: OBGYN CLINIC | Facility: CLINIC | Age: 19
End: 2024-03-14
Payer: COMMERCIAL

## 2024-03-14 DIAGNOSIS — N83.209 CYST OF OVARY, UNSPECIFIED LATERALITY: Primary | ICD-10-CM

## 2024-03-14 DIAGNOSIS — N94.6 DYSMENORRHEA: ICD-10-CM

## 2024-03-14 PROCEDURE — 99213 OFFICE O/P EST LOW 20 MIN: CPT | Performed by: OBSTETRICS & GYNECOLOGY

## 2024-03-14 NOTE — PROGRESS NOTES
RETURN GYN VIDEO VISIT  [facility]    CHIEF COMPLAINT:    Chief Complaint   Patient presents with    Consult     Discuss treatment of ovarian cyst       HISTORY OF PRESENT ILLNESS:    MARGE is a 18 year old female  here for review ultrasound done for finding of cysts on CT ordered by PCP for pain.     Previous visit, patient reported:   has had intermittent pain when she spots.  had pain 2024 and was seen in ED - was given pain and antinausea meds. Then had follow up with PCP who ordered CT.  Most recently has pain around time of menses which are very light due to Sklya. Pain lasts about 10-15 min. Gets chills, gets nauseated. Happens once monthly around the time of her pains.      Denies fever, chills, nausea or vomiting. Denies current pain.      Had Katina IUD placed end of October. By outside provider. Menses are lighter, but longer. Uses 3-4  pads on heavy days   Previously had admission for TOA 2023.     PROCEDURE: CT ABDOMEN + PELVIS (CONTRAST ONLY) (CPT=74177)     COMPARISON: [facility],  PELVIS W EV (CPT=76856/70809), 2023, 3:55 PM.     INDICATIONS: Right lower quadrant pain.     TECHNIQUE: CT images of the abdomen and pelvis were obtained with non-ionic intravenous contrast material.  Automated exposure control for dose reduction was used. Adjustment of the mA and/or kV was done based on the patient's size. Use of iterative  reconstruction technique for dose reduction was used.  Dose information is transmitted to the ACR (American College of Radiology) NRDR (National Radiology Data Registry) which includes the Dose Index Registry.     FINDINGS:  LIVER: No enlargement, atrophy, abnormal density, or significant focal lesion.    BILIARY: The gallbladder is present.  No intra- or extrahepatic biliary ductal dilatation.  SPLEEN: No enlargement or focal lesion.    STOMACH: No gastric obstruction.  Duodenum is unremarkable.  PANCREAS: No lesion, fluid  collection, ductal dilatation, or atrophy.    ADRENALS: No nodule or enlargement.  KIDNEYS: No enhancing renal lesion or hydroureteronephrosis.  AORTA/VASCULAR:   No aortic aneurysm or dissection.  Retroaortic left renal vein.  RETROPERITONEUM: No mass or enlarged adenopathy.    BOWEL/MESENTERY: Moderate volume of fecal material throughout the large bowel from the cecum to the rectum.  There is no small or large bowel obstruction. The terminal ileum and appendix (series 2, image 94) are within normal limits. There is no  significant colonic diverticulosis. There is no free air, free fluid, or lymphadenopathy in the abdomen or pelvis.  ABDOMINAL WALL: No suspicious mass lesion or significant hernia.  URINARY BLADDER: No visible focal wall thickening, lesion or calculus.    PELVIC NODES: No enlarged mass or adenopathy.    PELVIC ORGANS: Anteverted uterus with IUD.  Gross satisfactory position of the IUD.  Right ovary appears enlarged and replaced by a cystic structure measuring 45 x 55 mm.  Contra left ovary is normal in size at 32 x 21 mm and is otherwise unremarkable by   CT imaging technique.  BONES:   No significant bony lesion or fracture.  LUNG BASES: No visible pulmonary or pleural disease.  OTHER: Negative.                 Impression   CONCLUSION:  1. Right ovary is enlarged which is secondary to an enlarged 5 x 6 cm cyst.  This can be better characterized with pelvic ultrasound. IUD is present.  Left ovary has normal CT appearance.  2. There is a moderate quantity of fecal material throughout the large bowel without small bowel dilatation. This can be seen as a normal variant or with constipation.     Dictated by (CST): Job Contreras MD on 2/24/2024 at 4:14 PM      Finalized by (CST): Job Contreras MD on 2/24/2024 at 4:19 PM       Narrative   PROCEDURE:  PELVIS W EV (CPT=76856/32917)     COMPARISON: [facility],  PELVIS W EV (CPT=76856/65010), 8/19/2023, 3:55 PM.  [facility]  [facility], CT ABDOMEN + PELVIS (CONTRAST ONLY) (CPT=74177), 2/24/2024, 3:01 PM.     INDICATIONS: Right ovarian cyst.     TECHNIQUE: Pelvic ultrasound using transabdominal and transvaginal technique.  A transvaginal scan was performed for better assessment of the uterus and adnexal structures.     FINDINGS:  UTERUS:   Retroverted.  Size is 7.0 x 3.9 x 4.7 cm.       ENDOMETRIUM: An intrauterine device is again noted within the endometrium in the uterine body/fundus, which is in stable customary positioning.  Endometrial thickness is 6 mm.  No fluid or other mass in the endometrial canal.    MYOMETRIUM: Normal echogenicity.  No masses.  The cervix is unremarkable.       OVARIES AND ADNEXA:     RIGHT:   The right ovary measures 4.5 x 3.8 x 3.8 cm.  Three homogeneous hypoechoic cysts are noted in the right ovary measuring 2.2 cm, 1.7 cm and 1.3 cm, respectively.  These cysts were present on 08/19/2023 and measured 2.0 cm, 1.3 cm and 1.5 cm at  that time.  LEFT:   The left ovary measures 3.3 x 3.1 x 3.0 cm.  There is a 2.3 cm anechoic follicle arising from the left ovary that is most consistent with a dominant physiologic follicle.  There is also a heterogeneous hyperechoic area in the left adnexa abutting   the ovary measuring approximately 2.4 x 1.8 x 0.9 cm, which measures smaller than the 4.9 x 1.4 x 2.7 cm lesion questioned on 08/19/2023 and appears more hyperechoic compared to that study.     CUL-DE-SAC:   Normal.  No free fluid or mass.    OTHER: Negative.  Bladder appears normal.                 Impression   CONCLUSION:  1. Right ovary is again enlarged secondary to the presence of 3 small complicated cysts, which are similar in size compared to the 08/19/2023 ultrasound.  Endometriomas are a consideration.  A follow-up pre/post contrast MRI of the pelvis could be  considered to attempt more definitive characterization.  2. Complex left adnexal structure abutting the margin of the left ovary appears to  have decreased in size compared to August 2023 and appears more hyperechoic.  This finding is nonspecific although hydrosalpinx with internal complex/hemorrhagic fluid is  again a consideration including related to endometriosis.  Again, this finding could also be further assessed on the pre/post contrast pelvic MRI suggested above.  3. Retroverted uterus is normal in size without fibroids or endometrial thickening.  An intrauterine device is in stable customary positioning.             Dictated by (CST): Tony Clark MD on 3/07/2024 at 11:03 AM      Finalized by (CST): Tony Clark MD on 3/07/2024 at 11:09 AM           Narrative   PROCEDURE: US PELVIS W EV (CPT=76856/00503)     COMPARISON: [facility], US PELVIS W EV (CPT=76856/15593), 8/19/2023, 3:55 PM.  [facility], CT ABDOMEN + PELVIS (CONTRAST ONLY) (CPT=74177), 2/24/2024, 3:01 PM.     INDICATIONS: Right ovarian cyst.     TECHNIQUE: Pelvic ultrasound using transabdominal and transvaginal technique.  A transvaginal scan was performed for better assessment of the uterus and adnexal structures.     FINDINGS:  UTERUS:   Retroverted.  Size is 7.0 x 3.9 x 4.7 cm.       ENDOMETRIUM: An intrauterine device is again noted within the endometrium in the uterine body/fundus, which is in stable customary positioning.  Endometrial thickness is 6 mm.  No fluid or other mass in the endometrial canal.    MYOMETRIUM: Normal echogenicity.  No masses.  The cervix is unremarkable.       OVARIES AND ADNEXA:     RIGHT:   The right ovary measures 4.5 x 3.8 x 3.8 cm.  Three homogeneous hypoechoic cysts are noted in the right ovary measuring 2.2 cm, 1.7 cm and 1.3 cm, respectively.  These cysts were present on 08/19/2023 and measured 2.0 cm, 1.3 cm and 1.5 cm at  that time.  LEFT:   The left ovary measures 3.3 x 3.1 x 3.0 cm.  There is a 2.3 cm anechoic follicle arising from the left ovary that is most consistent with a  dominant physiologic follicle.  There is also a heterogeneous hyperechoic area in the left adnexa abutting   the ovary measuring approximately 2.4 x 1.8 x 0.9 cm, which measures smaller than the 4.9 x 1.4 x 2.7 cm lesion questioned on 08/19/2023 and appears more hyperechoic compared to that study.     CUL-DE-SAC:   Normal.  No free fluid or mass.    OTHER: Negative.  Bladder appears normal.                 Impression   CONCLUSION:  1. Right ovary is again enlarged secondary to the presence of 3 small complicated cysts, which are similar in size compared to the 08/19/2023 ultrasound.  Endometriomas are a consideration.  A follow-up pre/post contrast MRI of the pelvis could be  considered to attempt more definitive characterization.  2. Complex left adnexal structure abutting the margin of the left ovary appears to have decreased in size compared to August 2023 and appears more hyperechoic.  This finding is nonspecific although hydrosalpinx with internal complex/hemorrhagic fluid is  again a consideration including related to endometriosis.  Again, this finding could also be further assessed on the pre/post contrast pelvic MRI suggested above.  3. Retroverted uterus is normal in size without fibroids or endometrial thickening.  An intrauterine device is in stable customary positioning.             Dictated by (CST): Tony Clark MD on 3/07/2024 at 11:03 AM      Finalized by (CST): Tony Clark MD on 3/07/2024 at 11:09 AM           States noticed slight cramping this week.    REVIEW OF SYSTEMS:   CONSTITUTIONAL:  negative for fevers, chills, sweats and fatigue  GASTROINTESTINAL:  negative for nausea, vomiting, blood in stool, constipation, diarrhea and abdominal pain  GENITOURINARY: negative for UTI sx  Negative for discharge   SKIN:  negative for  rash, skin lesion and pruritus  ENDOCRINE:  negative for acne, fatigue, weight gain and weight loss  BEHAVIOR/PSYCH:  negative for depressed mood, anhedonia and  anxiety    CURRENT MEDICATIONS:      Current Outpatient Medications:     levonorgestrel (SAVITA) 13.5 MG Intrauterine IUD, 1 each by Intrauterine route one time., Disp: , Rfl:     ondansetron (ZOFRAN) 4 mg tablet, Take 1 tablet (4 mg total) by mouth every 8 (eight) hours as needed for Nausea. (Patient not taking: Reported on 3/6/2024), Disp: 20 tablet, Rfl: 0    PAST MEDICAL, SOCIAL AND FAMILY HISTORY:    Past Medical History:   Diagnosis Date    Anxiety     Depression      Past Surgical History:   Procedure Laterality Date    PATIENT DENIES ANY SURGICAL HISTORY       Family History   Problem Relation Age of Onset    Brain Cancer Father 67    Breast Cancer Neg     Uterine Cancer Neg     Ovarian Cancer Neg      Social History     Socioeconomic History    Marital status: Single   Tobacco Use    Smoking status: Never    Smokeless tobacco: Never   Substance and Sexual Activity    Alcohol use: Never    Drug use: Never    Sexual activity: Yes     Partners: Male     Birth control/protection: I.U.D.   Other Topics Concern    Blood Transfusions No           PHYSICAL EXAM:   Patient's last menstrual period was 02/28/2024 (approximate).; There is no height or weight on file to calculate BMI.      CONSTITUTIONAL:  Awake, alert, cooperative, no apparent distress    Video visit    PSYCH:  Affect Normal      ASSESSMENT AND PLAN:    ICD-10-CM    1. Cyst of ovary, unspecified laterality  N83.209       2. Dysmenorrhea  N94.6         Possible endometriosis.  Reviewed diagnosis of endometriosis is definitively made with laparoscopic sampling.  Reviewed that the mainstay of endometriosis treatment is hormonal.  Given pain is limited to her menstrual cycle, consider hormonal methods of contraception to suppress menstrual cycle.  Reviewed other treatment options  IUD endometriosis treatment including NSAIDs, hormonal comfort contraception, Orilissa.  Reviewed all that Savita IUD is the lowest dose option for progesterone intrauterine  devices.  Reviewed that Mirena is an option for endometriosis treatment.  She would like a trial of Mirena IUD.  Patient can follow-up for IUD replacement.    25 minutes spent with chart review, obtaining history, counseling and coordination of care    Karely Bocanegra MD

## 2024-04-20 ENCOUNTER — PATIENT MESSAGE (OUTPATIENT)
Dept: OBGYN CLINIC | Facility: CLINIC | Age: 19
End: 2024-04-20

## 2024-04-22 NOTE — TELEPHONE ENCOUNTER
Reviewed chart 03/14/2024, regarding IUD placement.    Joe Welch,      If you are referring to an IUD, than the recommendation is not to have unprotected sex 14 days prior to insertion.  If you already have an IUD and are replacing, please wait to have intercourse after insertion.     Any concerns or additional questions, please call us at .    From: Rebekah Hood  To: Karely Bocanegra  Sent: 4/20/2024  6:43 PM CDT  Subject: Procedure Question     Hi good afternoon! i had a question,  for the replacement that i get on Tuesday Im not supposed to be sexually active before replacement correct ?

## 2024-04-23 ENCOUNTER — OFFICE VISIT (OUTPATIENT)
Dept: OBGYN CLINIC | Facility: CLINIC | Age: 19
End: 2024-04-23
Payer: COMMERCIAL

## 2024-04-23 VITALS
DIASTOLIC BLOOD PRESSURE: 68 MMHG | BODY MASS INDEX: 32.32 KG/M2 | HEIGHT: 62 IN | WEIGHT: 175.63 LBS | SYSTOLIC BLOOD PRESSURE: 122 MMHG

## 2024-04-23 DIAGNOSIS — Z30.433 ENCOUNTER FOR IUD REMOVAL AND REINSERTION: Primary | ICD-10-CM

## 2024-04-23 LAB
CONTROL LINE PRESENT WITH A CLEAR BACKGROUND (YES/NO): YES YES/NO
KIT LOT #: NORMAL NUMERIC
PREGNANCY TEST, URINE: NEGATIVE

## 2024-04-23 PROCEDURE — 3008F BODY MASS INDEX DOCD: CPT | Performed by: OBSTETRICS & GYNECOLOGY

## 2024-04-23 PROCEDURE — 81025 URINE PREGNANCY TEST: CPT | Performed by: OBSTETRICS & GYNECOLOGY

## 2024-04-23 PROCEDURE — 3078F DIAST BP <80 MM HG: CPT | Performed by: OBSTETRICS & GYNECOLOGY

## 2024-04-23 PROCEDURE — 58300 INSERT INTRAUTERINE DEVICE: CPT | Performed by: OBSTETRICS & GYNECOLOGY

## 2024-04-23 PROCEDURE — 3074F SYST BP LT 130 MM HG: CPT | Performed by: OBSTETRICS & GYNECOLOGY

## 2024-04-23 PROCEDURE — 58301 REMOVE INTRAUTERINE DEVICE: CPT | Performed by: OBSTETRICS & GYNECOLOGY

## 2024-04-23 NOTE — PROCEDURES
Procedure: IUD removal  [facility]  Indications: desires replacement with Mirena for better control of dysmenorrhea, suspected endometriosis    Reviewed risks and benefits of procedure - informed consent obtained and time out performed.   IUD strings visualized and grasped with ring forceps. IUD removed in its entirety without complication.  Patient tolerated the procedure well.        IUD INSERTION PROCEDURE NOTE  [facility]    Rbeekah Hood is a 18 year old female.    Pt is here for IUD placement. Mirena  PAP: NA-  STI: history TOA  Pregnancy test: negative   Prior contraception: Katina    The patient was informed regarding indication for procedure, technique, side effects and alternatives. The risks of proceudre including bleeding and infection as well as an approximately 1/1000 risk of uterine perforation with assiciated need for surgical removal of the device. Reviewed small risk of expulsion. Reviewed a <0.5% rsk of pregnancy failure with intrauterine contraception and risk for ectopic pregnancy in this case.   Informed consent was obtained. Time out performed.     EXAM:  : normal external vagina; speculum exam reveals normal vaginal walls and normal cervix    PROCEDURE:  The patient was prepped and draped. Tenaculum was placed on the cervix. The uterus sounded to 7 cm. The IUD was inserted. The IUD applicator was removed without difficulty. The IUD strings were shortened to 3 cm. Good hemostasis at the tenaculum site and the cervical os was noted.     Aftercare instructions were provided to the patient.   The patient indicates understanding of these issues and agrees to the plan.  The patient is asked to return in 3 weeks for IUD check.

## 2024-05-20 ENCOUNTER — OFFICE VISIT (OUTPATIENT)
Dept: OBGYN CLINIC | Facility: CLINIC | Age: 19
End: 2024-05-20

## 2024-05-20 VITALS
DIASTOLIC BLOOD PRESSURE: 68 MMHG | SYSTOLIC BLOOD PRESSURE: 120 MMHG | WEIGHT: 178.19 LBS | HEIGHT: 62 IN | BODY MASS INDEX: 32.79 KG/M2

## 2024-05-20 DIAGNOSIS — Z30.431 SURVEILLANCE FOR BIRTH CONTROL, INTRAUTERINE DEVICE: Primary | ICD-10-CM

## 2024-05-20 NOTE — PROGRESS NOTES
IUD CHECK  [facility]    Return Office Visit  CHIEF COMPLAINT:    No chief complaint on file.        SUBJECTIVE:   18 year [id number] here for No chief complaint on file.      Mirena placed 24   Denies pelvic pain, dyspareunia, vaginal discharge and odor.    Has had intermittent spotting/light bleeding.  Can feel strings.  Denies c/o      REVIEW OF SYSTEMS:   CONSTITUTIONAL:  negative for fevers, chills, sweats and fatigue  GASTROINTESTINAL:  negative for nausea, vomiting, blood in stool, constipation, diarrhea and abdominal pain  GENITOURINARY: negative for dysuria and frequency  Negative for pelvic pain   SKIN:  negative for  rash, skin lesion and pruritus  ENDOCRINE:  negative for acne, fatigue, weight gain and weight loss  BEHAVIOR/PSYCH:  negative for depressed mood, anhedonia and anxiety      O: BP: 122/76 (18 1545)  Length: 160 cm (5' 3\") (18 1545)  Weight: 77.1 kg (170 lb) (18 1545)    PHYSICAL EXAM:   No LMP recorded. /76   Ht 160 cm (5' 3\")   Wt 77.1 kg (170 lb)   BMI 30.11 kg/m²     CONSTITUTIONAL:  Awake, alert, cooperative, no apparent distress  EYES: sclera clear and conjunctiva normal  GASTROINTESTINAL:  normal bowel sounds, soft, non-distended, non-tender, no masses palpated  GENITAL/URINARY:    External Genitalia:  General appearance; normal, Hair distribution; normal, Lesions absent   Urethral Meatus:  Lesions absent, Prolapse absent  Bladder:  Tenderness absent, Cystocele absent  Vagina:  Discharge absent, Lesions absent, Pelvic support normal  Cervix:  Lesions absent, Discharge absent, Tenderness absent; IUD strings visualized - 3.5 cm in length  Uterus:  Size normal, Masses absent, Tenderness absent  Adnexa:  Masses absent, Tenderness absent  Anus/Perineum:  Lesions absent  SKIN:  No rashes  PSYCH:  Affect Normal         ASSESSMENT AND PLAN:    ICD-10-CM    1. Surveillance for birth control, intrauterine device  Z30.431         Comment: strings visualized  suggestive of adequate positioning  Plan:     RTC for annual exam; pap due next year    Karely Bocanegra MD

## 2025-08-25 ENCOUNTER — APPOINTMENT (OUTPATIENT)
Dept: ULTRASOUND IMAGING | Facility: HOSPITAL | Age: 20
End: 2025-08-25
Attending: EMERGENCY MEDICINE

## 2025-08-25 ENCOUNTER — HOSPITAL ENCOUNTER (EMERGENCY)
Facility: HOSPITAL | Age: 20
Discharge: HOME | End: 2025-08-25
Attending: EMERGENCY MEDICINE

## 2025-08-25 VITALS
HEIGHT: 63 IN | HEART RATE: 86 BPM | DIASTOLIC BLOOD PRESSURE: 86 MMHG | RESPIRATION RATE: 20 BRPM | SYSTOLIC BLOOD PRESSURE: 126 MMHG | WEIGHT: 196 LBS | BODY MASS INDEX: 34.73 KG/M2 | TEMPERATURE: 98 F | OXYGEN SATURATION: 98 %

## 2025-08-25 DIAGNOSIS — N83.201 CYST OF RIGHT OVARY: Primary | ICD-10-CM

## 2025-08-25 LAB
B-HCG UR QL: NEGATIVE
BILIRUB UR QL: NEGATIVE
CLARITY UR: CLEAR
GLUCOSE UR-MCNC: NORMAL MG/DL
HGB UR QL STRIP.AUTO: NEGATIVE
KETONES UR-MCNC: NEGATIVE MG/DL
LEUKOCYTE ESTERASE UR QL STRIP.AUTO: NEGATIVE
NITRITE UR QL STRIP.AUTO: NEGATIVE
PH UR: 7.5 (ref 5–8)
SP GR UR STRIP: 1.02 (ref 1–1.03)
UROBILINOGEN UR STRIP-ACNC: NORMAL

## 2025-08-25 PROCEDURE — 76830 TRANSVAGINAL US NON-OB: CPT | Performed by: EMERGENCY MEDICINE

## 2025-08-25 PROCEDURE — 81003 URINALYSIS AUTO W/O SCOPE: CPT | Performed by: EMERGENCY MEDICINE

## 2025-08-25 PROCEDURE — 93975 VASCULAR STUDY: CPT | Performed by: EMERGENCY MEDICINE

## 2025-08-25 PROCEDURE — 76856 US EXAM PELVIC COMPLETE: CPT | Performed by: EMERGENCY MEDICINE

## 2025-08-25 PROCEDURE — 99284 EMERGENCY DEPT VISIT MOD MDM: CPT

## 2025-08-25 PROCEDURE — 81025 URINE PREGNANCY TEST: CPT

## 2025-08-25 RX ORDER — KETOROLAC TROMETHAMINE 10 MG/1
10 TABLET, FILM COATED ORAL EVERY 6 HOURS PRN
Qty: 15 TABLET | Refills: 0 | Status: SHIPPED | OUTPATIENT
Start: 2025-08-25 | End: 2025-09-01

## (undated) NOTE — LETTER
Rebekah Hood, :12/15/2005    CONSENT FOR PROCEDURE/SEDATION    1. I authorize the performance upon Rebekah Hood  the following: Insertion Intrauterine Device    2. I authorize Dr. Karely Bocanegra MD (and whomever is designated as the doctor’s assistant), to perform the above-mentioned procedures.    3. If any unforeseen conditions arise during this procedure calling for additional  procedures, operations, or medications (including anesthesia and blood transfusion), I further request and authorize the doctor to do whatever he/she deems advisable in my interest.    4. I consent to the taking and reproduction of any photographs in the course of this procedure for professional purposes.    5. I consent to the administration of such sedation as may be considered necessary or advisable by the physician responsible for this service, with the exception of ______________________________________________________    6. I have been informed by my doctor of the nature and purpose of this procedure sedation, possible alternative methods of treatment, risk involved and possible complications.    Signature of Patient:_______________________________________________    Signature of person authorized to consent for patient:  _______________________________________________________________    Relationship to patient: ____________________________________________    Witness: _________________________________________ Date:___________     Physician Signature: _______________________________ Date:___________